# Patient Record
Sex: FEMALE | Race: WHITE | NOT HISPANIC OR LATINO | Employment: FULL TIME | RURAL
[De-identification: names, ages, dates, MRNs, and addresses within clinical notes are randomized per-mention and may not be internally consistent; named-entity substitution may affect disease eponyms.]

---

## 2017-02-17 ENCOUNTER — OFFICE VISIT (OUTPATIENT)
Dept: RETAIL CLINIC | Facility: CLINIC | Age: 37
End: 2017-02-17

## 2017-02-17 VITALS
BODY MASS INDEX: 27.23 KG/M2 | HEIGHT: 62 IN | WEIGHT: 148 LBS | SYSTOLIC BLOOD PRESSURE: 130 MMHG | TEMPERATURE: 99.4 F | DIASTOLIC BLOOD PRESSURE: 74 MMHG | HEART RATE: 83 BPM | OXYGEN SATURATION: 98 % | RESPIRATION RATE: 18 BRPM

## 2017-02-17 DIAGNOSIS — J06.9 VIRAL URI: Primary | ICD-10-CM

## 2017-02-17 DIAGNOSIS — R53.81 MALAISE: ICD-10-CM

## 2017-02-17 LAB
EXPIRATION DATE: NORMAL
EXPIRATION DATE: NORMAL
FLUAV AG NPH QL: NORMAL
FLUBV AG NPH QL: NORMAL
INTERNAL CONTROL: NORMAL
INTERNAL CONTROL: NORMAL
Lab: NORMAL
Lab: NORMAL
S PYO AG THROAT QL: NEGATIVE

## 2017-02-17 PROCEDURE — 87804 INFLUENZA ASSAY W/OPTIC: CPT | Performed by: NURSE PRACTITIONER

## 2017-02-17 PROCEDURE — 99213 OFFICE O/P EST LOW 20 MIN: CPT | Performed by: NURSE PRACTITIONER

## 2017-02-17 PROCEDURE — 87880 STREP A ASSAY W/OPTIC: CPT | Performed by: NURSE PRACTITIONER

## 2017-02-17 NOTE — PATIENT INSTRUCTIONS
"Upper Respiratory Infection, Adult  Most upper respiratory infections (URIs) are caused by a virus. A URI affects the nose, throat, and upper air passages. The most common type of URI is often called \"the common cold.\"  HOME CARE   · Take medicines only as told by your doctor.  · Gargle warm saltwater or take cough drops to comfort your throat as told by your doctor.  · Use a warm mist humidifier or inhale steam from a shower to increase air moisture. This may make it easier to breathe.  · Drink enough fluid to keep your pee (urine) clear or pale yellow.  · Eat soups and other clear broths.  · Have a healthy diet.  · Rest as needed.  · Go back to work when your fever is gone or your doctor says it is okay.  ¨ You may need to stay home longer to avoid giving your URI to others.  ¨ You can also wear a face mask and wash your hands often to prevent spread of the virus.  · Use your inhaler more if you have asthma.  · Do not use any tobacco products, including cigarettes, chewing tobacco, or electronic cigarettes. If you need help quitting, ask your doctor.  GET HELP IF:  · You are getting worse, not better.  · Your symptoms are not helped by medicine.  · You have chills.  · You are getting more short of breath.  · You have brown or red mucus.  · You have yellow or brown discharge from your nose.  · You have pain in your face, especially when you bend forward.  · You have a fever.  · You have puffy (swollen) neck glands.  · You have pain while swallowing.  · You have white areas in the back of your throat.  GET HELP RIGHT AWAY IF:   · You have very bad or constant:    Headache.    Ear pain.    Pain in your forehead, behind your eyes, and over your cheekbones (sinus pain).    Chest pain.  · You have long-lasting (chronic) lung disease and any of the following:    Wheezing.    Long-lasting cough.    Coughing up blood.    A change in your usual mucus.  · You have a stiff neck.  · You have changes in your:    Vision.   "  Hearing.    Thinking.    Mood.  MAKE SURE YOU:   · Understand these instructions.  · Will watch your condition.  · Will get help right away if you are not doing well or get worse.     This information is not intended to replace advice given to you by your health care provider. Make sure you discuss any questions you have with your health care provider.     Document Released: 06/05/2009 Document Revised: 05/03/2016 Document Reviewed: 03/25/2015  [employer] Interactive Patient Education ©2016 [employer]

## 2017-02-17 NOTE — PROGRESS NOTES
Subjective   Brandon Edwards is a 36 y.o. female.     Flu Symptoms   This is a new problem. Episode onset: 2 days ago. The problem occurs constantly. The problem has been gradually worsening. Associated symptoms include congestion, diaphoresis (woke up sweating), fatigue, myalgias and a sore throat (mild). Pertinent negatives include no abdominal pain, chills, coughing, fever, headaches, nausea, swollen glands or vomiting. She has tried NSAIDs for the symptoms. The treatment provided mild relief.        The following portions of the patient's history were reviewed and updated as appropriate: allergies, current medications, past family history, past medical history, past social history, past surgical history and problem list.    Review of Systems   Constitutional: Positive for diaphoresis (woke up sweating) and fatigue. Negative for chills and fever.   HENT: Positive for congestion, postnasal drip and sore throat (mild). Negative for ear pain, rhinorrhea, sinus pressure and sneezing.    Respiratory: Negative for cough and wheezing.    Cardiovascular: Negative.    Gastrointestinal: Positive for diarrhea. Negative for abdominal pain, nausea and vomiting.   Musculoskeletal: Positive for myalgias.   Neurological: Negative for headaches.       Objective   Physical Exam   Constitutional: Vital signs are normal. She appears well-developed and well-nourished. She is active.   HENT:   Head: Normocephalic.   Right Ear: Hearing, external ear and ear canal normal. Tympanic membrane is not injected and not erythematous. A middle ear effusion is present.   Left Ear: Hearing, external ear and ear canal normal. Tympanic membrane is not injected and not erythematous. A middle ear effusion is present.   Nose: Nose normal. Right sinus exhibits no maxillary sinus tenderness and no frontal sinus tenderness. Left sinus exhibits no maxillary sinus tenderness and no frontal sinus tenderness.   Mouth/Throat: Uvula is midline and mucous  membranes are normal. Posterior oropharyngeal erythema (Mild) present. No oropharyngeal exudate, posterior oropharyngeal edema or tonsillar abscesses. Tonsils are 1+ on the right. Tonsils are 1+ on the left. No tonsillar exudate.       Cardiovascular: Normal rate, regular rhythm, S1 normal, S2 normal and normal heart sounds.    Pulmonary/Chest: Effort normal and breath sounds normal. She has no decreased breath sounds. She has no wheezes. She has no rhonchi. She has no rales.   Lymphadenopathy:     She has no cervical adenopathy.   Neurological: She is alert.       Assessment/Plan   Brandon was seen today for flu symptoms.    Diagnoses and all orders for this visit:    Malaise  -     POC Rapid Strep A  -     POC Influenza A / B    Viral URI    Patient advised that the illness appears to be viral in nature and is self-limiting.   - Take Tylenol/Motrin as needed.  - Push fluids and rest  - Follow up with PCP if symptoms worsen or do not improve

## 2017-04-08 ENCOUNTER — OFFICE VISIT (OUTPATIENT)
Dept: RETAIL CLINIC | Facility: CLINIC | Age: 37
End: 2017-04-08

## 2017-04-08 VITALS
OXYGEN SATURATION: 98 % | TEMPERATURE: 98.6 F | BODY MASS INDEX: 26.57 KG/M2 | DIASTOLIC BLOOD PRESSURE: 80 MMHG | WEIGHT: 144.4 LBS | HEIGHT: 62 IN | RESPIRATION RATE: 20 BRPM | HEART RATE: 77 BPM | SYSTOLIC BLOOD PRESSURE: 102 MMHG

## 2017-04-08 DIAGNOSIS — J01.10 ACUTE FRONTAL SINUSITIS, RECURRENCE NOT SPECIFIED: Primary | ICD-10-CM

## 2017-04-08 PROCEDURE — 99213 OFFICE O/P EST LOW 20 MIN: CPT | Performed by: NURSE PRACTITIONER

## 2017-04-08 PROCEDURE — 96372 THER/PROPH/DIAG INJ SC/IM: CPT | Performed by: NURSE PRACTITIONER

## 2017-04-08 RX ORDER — BROMPHENIRAMINE MALEATE, PSEUDOEPHEDRINE HYDROCHLORIDE, AND DEXTROMETHORPHAN HYDROBROMIDE 2; 30; 10 MG/5ML; MG/5ML; MG/5ML
5-10 SYRUP ORAL 4 TIMES DAILY PRN
Qty: 120 ML | Refills: 0 | Status: SHIPPED | OUTPATIENT
Start: 2017-04-08

## 2017-04-08 RX ORDER — METHYLPREDNISOLONE ACETATE 80 MG/ML
80 INJECTION, SUSPENSION INTRA-ARTICULAR; INTRALESIONAL; INTRAMUSCULAR; SOFT TISSUE ONCE
Status: COMPLETED | OUTPATIENT
Start: 2017-04-08 | End: 2017-04-08

## 2017-04-08 RX ORDER — ONDANSETRON 4 MG/1
4 TABLET, ORALLY DISINTEGRATING ORAL EVERY 8 HOURS PRN
Qty: 20 TABLET | Refills: 0 | Status: SHIPPED | OUTPATIENT
Start: 2017-04-08

## 2017-04-08 RX ORDER — CEFUROXIME AXETIL 500 MG/1
500 TABLET ORAL 2 TIMES DAILY
Qty: 20 TABLET | Refills: 0 | Status: SHIPPED | OUTPATIENT
Start: 2017-04-08

## 2017-04-08 RX ADMIN — METHYLPREDNISOLONE ACETATE 80 MG: 80 INJECTION, SUSPENSION INTRA-ARTICULAR; INTRALESIONAL; INTRAMUSCULAR; SOFT TISSUE at 11:03

## 2017-04-08 NOTE — PROGRESS NOTES
Subjective   Brandon Edwards is a 36 y.o. female.     Cough   This is a new problem. The current episode started in the past 7 days. The problem has been gradually worsening. The problem occurs every few hours. The cough is productive of purulent sputum. Associated symptoms include ear pain, nasal congestion, postnasal drip, rhinorrhea and a sore throat. The symptoms are aggravated by exercise. She has tried nothing for the symptoms. The treatment provided no relief.        The following portions of the patient's history were reviewed and updated as appropriate: allergies, current medications, past family history, past medical history, past social history, past surgical history and problem list.  ...    Review of Systems   Constitutional: Negative.    HENT: Positive for ear pain, postnasal drip, rhinorrhea and sore throat.    Eyes: Negative.    Respiratory: Positive for cough.    Cardiovascular: Negative.    Gastrointestinal: Negative.    Endocrine: Negative.    Genitourinary: Negative.    Musculoskeletal: Negative.    Skin: Negative.    Allergic/Immunologic: Negative.    Neurological: Negative.    Hematological: Negative.    Psychiatric/Behavioral: Negative.        Objective   Physical Exam   Constitutional: She is oriented to person, place, and time. She appears well-nourished.   HENT:   Head: Normocephalic and atraumatic.   Right Ear: Hearing, tympanic membrane, external ear and ear canal normal.   Left Ear: Hearing, tympanic membrane, external ear and ear canal normal.   Nose: Mucosal edema present. Right sinus exhibits frontal sinus tenderness. Left sinus exhibits frontal sinus tenderness.   Mouth/Throat: Uvula is midline and mucous membranes are normal. Oropharyngeal exudate and posterior oropharyngeal erythema present. Tonsils are 1+ on the right. Tonsils are 1+ on the left.   Eyes: Conjunctivae are normal.   Neck: Neck supple.   Cardiovascular: Normal rate and regular rhythm.    Pulmonary/Chest: Effort  normal and breath sounds normal.   Abdominal: Soft. Bowel sounds are normal.   Musculoskeletal: Normal range of motion.   Neurological: She is alert and oriented to person, place, and time.   Skin: Skin is warm and dry.   Psychiatric: She has a normal mood and affect. Her behavior is normal.   Nursing note and vitals reviewed.      Assessment/Plan   Brandon was seen today for cough.    Diagnoses and all orders for this visit:    Acute frontal sinusitis, recurrence not specified  -     methylPREDNISolone acetate (DEPO-medrol) injection 80 mg; Inject 1 mL into the shoulder, thigh, or buttocks 1 (One) Time.    Other orders  -     ondansetron ODT (ZOFRAN ODT) 4 MG disintegrating tablet; Take 1 tablet by mouth Every 8 (Eight) Hours As Needed for Nausea or Vomiting.  -     brompheniramine-pseudoephedrine-DM 30-2-10 MG/5ML syrup; Take 5-10 mL by mouth 4 (Four) Times a Day As Needed for Cough.  -     cefuroxime (CEFTIN) 500 MG tablet; Take 1 tablet by mouth 2 (Two) Times a Day.